# Patient Record
(demographics unavailable — no encounter records)

---

## 2025-03-04 NOTE — DISCUSSION/SUMMARY
[FreeTextEntry1] : 59-year-old -0-0-3 status post OSCAR presents with left breast pain for the past 3 months.  Physical examination indicated tenderness on the left but no discrete masses on the breast.  Physical exam also shows atrophic changes in the vagina and vulva.  Pap GC chlamydia sent.  Patient has been referred to breast specialist for evaluation.  Return in 3 months for follow-up.

## 2025-03-04 NOTE — HISTORY OF PRESENT ILLNESS
[N] : Patient is not sexually active [Y] : Positive pregnancy history [Menarche Age: ____] : age at menarche was [unfilled] [FreeTextEntry1] : 59-year-old -0-0-3 status post OSCAR presents with left breast pain for the past 3 months.  She denies breast discharge or bleeding.  Patient denies pelvic pain or dyspareunia. [PGHxTotal] : 3 [[facility]] : 3 [PGHxPremature] : 0 [PGHxAbortions] : 0 [[facility] : [facility] [PGHxABInduced] : 0 [PGHxABSpont] : 0 [PGHxEctopic] : 0 [PGHxMultBirths] : 0

## 2025-03-04 NOTE — PHYSICAL EXAM
[Chaperone Present] : A chaperone was present in the examining room during all aspects of the physical examination [FreeTextEntry2] : Jael [Appropriately responsive] : appropriately responsive [Alert] : alert [No Acute Distress] : no acute distress [No Lymphadenopathy] : no lymphadenopathy [Regular Rate Rhythm] : regular rate rhythm [No Murmurs] : no murmurs [Clear to Auscultation B/L] : clear to auscultation bilaterally [Tender] : tender [Soft] : soft [Non-tender] : non-tender [Non-distended] : non-distended [No HSM] : No HSM [No Lesions] : no lesions [No Mass] : no mass [Oriented x3] : oriented x3 [FreeTextEntry6] : Left breast tenderness.  No discrete masses.

## 2025-03-07 NOTE — PROCEDURE
[FreeTextEntry3] : Ultrasound left breast  Patient reports a palpable mass in the lateral left breast  Targeted exam demonstrates a 2 cm cyst  BI-RADS 2

## 2025-03-07 NOTE — ASSESSMENT
[FreeTextEntry1] : Left breast mass present for 2 weeks  The mass appears to be a simple cyst  Patient advised follow-up in 2 weeks  Aspiration will be performed at that time if the mass persists  A total of 30 minutes was spent in consultation evaluation review

## 2025-03-07 NOTE — HISTORY OF PRESENT ILLNESS
[FreeTextEntry1] : Patient presents a lump to the left breast for 3 weeks. Denies breast pain, redness on the skin, nipple discharge. Maternal aunt had breast cancer.  Patient had a history for lump removal to the left upper arm.  Last mammogram performed 1/2025 at [facility].

## 2025-03-07 NOTE — HISTORY OF PRESENT ILLNESS
[FreeTextEntry1] : Patient presents a lump to the left breast for 3 weeks. Denies breast pain, redness on the skin, nipple discharge. Maternal aunt had breast cancer.  Patient had a history for lump removal to the left upper arm.  Last mammogram performed 1/2025 at [facility].

## 2025-03-20 NOTE — HISTORY OF PRESENT ILLNESS
[FreeTextEntry1] : She states the mass has decreased in size  Presents lump for about 5-6 weeks  Denies pain, redness to the skin, nipple discharge  Last mammogram performed 1/2025 at [facility].